# Patient Record
Sex: FEMALE | Race: WHITE
[De-identification: names, ages, dates, MRNs, and addresses within clinical notes are randomized per-mention and may not be internally consistent; named-entity substitution may affect disease eponyms.]

---

## 2020-02-03 ENCOUNTER — HOSPITAL ENCOUNTER (EMERGENCY)
Dept: HOSPITAL 62 - ER | Age: 25
Discharge: HOME | End: 2020-02-03
Payer: COMMERCIAL

## 2020-02-03 VITALS — SYSTOLIC BLOOD PRESSURE: 125 MMHG | DIASTOLIC BLOOD PRESSURE: 68 MMHG

## 2020-02-03 DIAGNOSIS — K62.5: Primary | ICD-10-CM

## 2020-02-03 DIAGNOSIS — N83.202: ICD-10-CM

## 2020-02-03 DIAGNOSIS — N93.8: ICD-10-CM

## 2020-02-03 DIAGNOSIS — Z97.5: ICD-10-CM

## 2020-02-03 DIAGNOSIS — B37.9: ICD-10-CM

## 2020-02-03 LAB
ADD MANUAL DIFF: NO
ALBUMIN SERPL-MCNC: 4.9 G/DL (ref 3.5–5)
ALP SERPL-CCNC: 56 U/L (ref 38–126)
ANION GAP SERPL CALC-SCNC: 10 MMOL/L (ref 5–19)
APPEARANCE UR: CLEAR
APTT PPP: YELLOW S
AST SERPL-CCNC: 31 U/L (ref 14–36)
BACTERIA (WET MOUNT): (no result)
BASOPHILS # BLD AUTO: 0 10^3/UL (ref 0–0.2)
BASOPHILS NFR BLD AUTO: 0.3 % (ref 0–2)
BILIRUB DIRECT SERPL-MCNC: 0.2 MG/DL (ref 0–0.4)
BILIRUB SERPL-MCNC: 0.4 MG/DL (ref 0.2–1.3)
BILIRUB UR QL STRIP: NEGATIVE
BUN SERPL-MCNC: 14 MG/DL (ref 7–20)
CALCIUM: 9.8 MG/DL (ref 8.4–10.2)
CHLAM PCR: NOT DETECTED
CHLORIDE SERPL-SCNC: 104 MMOL/L (ref 98–107)
CO2 SERPL-SCNC: 26 MMOL/L (ref 22–30)
EOSINOPHIL # BLD AUTO: 0.1 10^3/UL (ref 0–0.6)
EOSINOPHIL NFR BLD AUTO: 1.1 % (ref 0–6)
EPITHELIALS (WET MOUNT): (no result)
ERYTHROCYTE [DISTWIDTH] IN BLOOD BY AUTOMATED COUNT: 13.5 % (ref 11.5–14)
GLUCOSE SERPL-MCNC: 91 MG/DL (ref 75–110)
GLUCOSE UR STRIP-MCNC: NEGATIVE MG/DL
HCT VFR BLD CALC: 40.9 % (ref 36–47)
HGB BLD-MCNC: 14.2 G/DL (ref 12–15.5)
INR PPP: 1
KETONES UR STRIP-MCNC: NEGATIVE MG/DL
LYMPHOCYTES # BLD AUTO: 2.1 10^3/UL (ref 0.5–4.7)
LYMPHOCYTES NFR BLD AUTO: 26.1 % (ref 13–45)
MCH RBC QN AUTO: 30.7 PG (ref 27–33.4)
MCHC RBC AUTO-ENTMCNC: 34.7 G/DL (ref 32–36)
MCV RBC AUTO: 89 FL (ref 80–97)
MONOCYTES # BLD AUTO: 0.7 10^3/UL (ref 0.1–1.4)
MONOCYTES NFR BLD AUTO: 8.3 % (ref 3–13)
NEUTROPHILS # BLD AUTO: 5.2 10^3/UL (ref 1.7–8.2)
NEUTS SEG NFR BLD AUTO: 64.2 % (ref 42–78)
NITRITE UR QL STRIP: NEGATIVE
PH UR STRIP: 8 [PH] (ref 5–9)
PLATELET # BLD: 220 10^3/UL (ref 150–450)
POTASSIUM SERPL-SCNC: 4.4 MMOL/L (ref 3.6–5)
PROT SERPL-MCNC: 8.2 G/DL (ref 6.3–8.2)
PROT UR STRIP-MCNC: NEGATIVE MG/DL
PROTHROMBIN TIME: 13.2 SEC (ref 11.4–15.4)
RBC # BLD AUTO: 4.62 10^6/UL (ref 3.72–5.28)
RBCS (WET MOUNT): (no result)
SP GR UR STRIP: 1.02
T.VAGINALIS (WET MOUNT): (no result)
TOTAL CELLS COUNTED % (AUTO): 100 %
UROBILINOGEN UR-MCNC: NEGATIVE MG/DL (ref ?–2)
WBC # BLD AUTO: 8.1 10^3/UL (ref 4–10.5)
WBCS (WET MOUNT): (no result)
YEAST (WET MOUNT): (no result)

## 2020-02-03 PROCEDURE — 87591 N.GONORRHOEAE DNA AMP PROB: CPT

## 2020-02-03 PROCEDURE — 93976 VASCULAR STUDY: CPT

## 2020-02-03 PROCEDURE — 36415 COLL VENOUS BLD VENIPUNCTURE: CPT

## 2020-02-03 PROCEDURE — 99284 EMERGENCY DEPT VISIT MOD MDM: CPT

## 2020-02-03 PROCEDURE — 87210 SMEAR WET MOUNT SALINE/INK: CPT

## 2020-02-03 PROCEDURE — 85025 COMPLETE CBC W/AUTO DIFF WBC: CPT

## 2020-02-03 PROCEDURE — 80053 COMPREHEN METABOLIC PANEL: CPT

## 2020-02-03 PROCEDURE — 81025 URINE PREGNANCY TEST: CPT

## 2020-02-03 PROCEDURE — 81001 URINALYSIS AUTO W/SCOPE: CPT

## 2020-02-03 PROCEDURE — 76830 TRANSVAGINAL US NON-OB: CPT

## 2020-02-03 PROCEDURE — 87491 CHLMYD TRACH DNA AMP PROBE: CPT

## 2020-02-03 PROCEDURE — 85610 PROTHROMBIN TIME: CPT

## 2020-02-03 NOTE — RADIOLOGY REPORT (SQ)
EXAM DESCRIPTION:  U/S NON OB PEL TV W/DOPPLER



COMPLETED DATE/TIME:  2/3/2020 2:17 pm



REASON FOR STUDY:  pelvic pain, bleeding



COMPARISON:  None.



TECHNIQUE:  Dynamic and static grayscale images acquired of the pelvis via transvaginal approach and 
recorded on PACS. Additional selected color Doppler and spectral images recorded.



LIMITATIONS:  None.



FINDINGS:  UTERUS: Contour normal.  No mass.

ENDOMETRIAL STRIPE: No focal or generalized thickening. No masses.

CERVIX:  The cervix measures 2.2 cm in length.  No nabothian cysts.

RIGHT OVARY AND DOPPLER: Normal size. No worrisome masses. Normal arterial vascular flow without evid
ence for torsion.

LEFT OVARY AND DOPPLER:  A 3.1 x 2.7 x 2.4 cm cyst.  A small follicle lies adjacent to the cyst.  Nor
mal arterial vascular flow without evidence for torsion.

FREE FLUID: None noted.

OTHER: No other significant finding.

MEASUREMENTS:

UTERUS: 7.3 x 4.1 x 3.3 cm.

ENDOMETRIAL STRIPE: 5.0 mm

RIGHT OVARY: 3.0 x 2.7 x 1.7 cm

LEFT OVARY: 4.2 x 4.1 x 2.9 cm



IMPRESSION:  1.  Left ovarian cyst.



TECHNICAL DOCUMENTATION:  JOB ID:  [id number]

 2011 [employer]- All Rights Reserved                          Rev-[id number]



Reading location - IP/workstation name: [email or web address]

## 2020-02-03 NOTE — ER DOCUMENT REPORT
ED Medical Screen (RME)





- General


Chief Complaint: Urinary Problem


Stated Complaint: URINARY PROBLEM


Time Seen by Provider: 02/03/20 12:58


Primary Care Provider: 


FAUSTO SANDY MD [Primary Care Provider] - Follow up as needed


Notes: 





HPI: 24-year-old female presenting to the emergency department stating "I am 

having blood with urination and bowel movements and also vaginally".  Patient 

states that she has moved her bowels several times in the last 24 hours, states 

that she has seen some blood with the bowel movements.  Has not had a bowel 

movement of just blood.  States also when she wipes with urinating or when she 

urinates she sees some blood in the toilet.  No clots.  Patient has Nexplanon 

and had not had a menstrual cycle in the last 2 years.  She stated she started 

having vaginal bleeding yesterday as well.  States that she has not had to use 

tampons or menstrual pads because it is not significantly heavy.  Complains of 

mild cramping in the lower pelvis.  No fever nausea vomiting





I have greeted and performed a rapid initial assessment of this patient.  A 

comprehensive ED assessment and evaluation of the patient, analysis of test 

results and completion of the medical decision making process will be conducted 

by additional ED providers








PHYSICAL EXAMINATION:





GENERAL: Well-appearing, well-nourished and in no acute distress.


HEAD: Atraumatic, normocephalic.


EYES:  sclera anicteric, conjunctiva are normal.


ENT: Moist mucous membranes.


NECK: Normal range of motion


LUNGS: Normal work of breathing


HEART: 2+ radial pulses bilaterally


ABD: limited by positioning for exam in triage.  No reproducible abdominal pain 

on palpation.  Pelvic and rectal exam deferred in triage


EXTREMITIES: no pitting or edema.  No cyanosis.


NEUROLOGICAL: No focal neurological deficits. Moves all extremities spontaneou

sly and on command.


PSYCH: Normal mood, normal affect.


SKIN: Warm, Dry, normal turgor, no rashes or lesions noted.











- Related Data


Allergies/Adverse Reactions: 


                                        





No Known Allergies Allergy (Unverified 02/03/20 12:58)


   











Physical Exam





- Vital signs


Vitals: 





                                        











Temp Pulse Resp BP Pulse Ox


 


 98.0 F   79   16   125/78   100 


 


 02/03/20 12:57  02/03/20 12:57  02/03/20 12:57  02/03/20 12:57  02/03/20 12:57














Course





- Vital Signs


Vital signs: 





                                        











Temp Pulse Resp BP Pulse Ox


 


 98.0 F   79   16   125/78   100 


 


 02/03/20 12:57  02/03/20 12:57  02/03/20 12:57  02/03/20 12:57  02/03/20 12:57














Doctor's Discharge





- Discharge


Referrals: 


FAUSTO SANDY MD [Primary Care Provider] - Follow up as needed

## 2020-02-03 NOTE — ER DOCUMENT REPORT
ED General





<AMALIA LINARES - Last Filed: 02/03/20 15:28>





- General


TRAVEL OUTSIDE OF THE [place] IN LAST 30 DAYS: No





<ABDIEL SHERIDAN - Last Filed: 02/03/20 16:30>





- General


Chief Complaint: Bloody Stools


Stated Complaint: URINARY PROBLEM


Time Seen by Provider: 02/03/20 12:58


Primary Care Provider: 


FAUSTO SANDY MD [COMMUNITY BASED STAFF] - Follow up as needed


Notes: 





Patient is a 24-year-old female with no significant past medical history who 

presents to the emergency department with a chief complaint of blood per rectum 

and blood per vagina that began yesterday.  She states she is having normal 

regular bowel movements however she noted some bright red and dark red blood 

mixed with the stool yesterday.  She denies any pain with bowel movements.  She 

admits to some vague lower abdominal/pelvic discomfort today.  She also notes 

that every time she has a bowel movement she gets bright red blood from the 

vagina but only with bowel movements.  She denies wearing any pads or tampons as

she states "it is not like that", reiterating that it is only with bowel 

movements that she bleeds from the vagina.  She does have a Nexplanon in place 

and has unprotected sex.  She states she is noticed a little bit of difference 

in odor of her normal vaginal discharge but otherwise normal.  Denies any pain 

with bowel movement.  (ABDIEL SHERIDAN)





- Related Data


Allergies/Adverse Reactions: 


                                        





No Known Allergies Allergy (Unverified 02/03/20 12:58)


   











Past Medical History





- Social History


Smoking Status: Unknown if Ever Smoked


Family History: None


Patient has suicidal ideation: No


Patient has homicidal ideation: No





<ABDIEL SHERIDAN - Last Filed: 02/03/20 16:30>





Review of Systems





- Review of Systems


Gastrointestinal: Abdominal pain, Blood streaked bowels


Genitourinary: Pain, Other - Vaginal bleeding


Female Genitourinary: Vaginal bleeding


-: Yes All other systems reviewed and negative





<ABDIEL SHERIDAN - Last Filed: 02/03/20 16:30>





Physical Exam





- General


General appearance: Appears well


In distress: None





- Respiratory


Respiratory status: No respiratory distress


Chest status: Nontender


Breath sounds: Normal


Chest palpation: Normal





- Cardiovascular


Rhythm: Regular


Heart sounds: Normal auscultation





- Abdominal


Inspection: Normal


Distension: No distension


Bowel sounds: Normal


Tenderness: Nontender


Organomegaly: No organomegaly





- Back


Back: Normal, Nontender.  No: CVA tenderness





- Neurological


Neuro grossly intact: Yes


Cognition: Normal


Orientation: AAOx4


Franca Coma Scale Eye Opening: Spontaneous


Wh[place] Coma Scale Verbal: Oriented


Franca Coma Scale Motor: Obeys Commands


Franca Coma Scale Total: 15


Speech: Normal


Motor strength normal: LUE, RUE, LLE, RLE


Sensory: Normal





- Psychological


Associated symptoms: Normal affect, Normal mood





- Skin


Skin Temperature: Warm


Skin Moisture: Dry


Skin Color: Normal





<ABDIEL SHERIDAN - Last Filed: 02/03/20 16:30>





- Vital signs


Vitals: 


                                        











Temp Pulse Resp BP Pulse Ox


 


 98.0 F   79   16   125/78   100 


 


 02/03/20 12:57  02/03/20 12:57  02/03/20 12:57  02/03/20 12:57  02/03/20 12:57














Course





- Laboratory


Result Diagrams: 


                                 02/03/20 13:18





                                 02/03/20 13:18





<AMALIA LINARES - Last Filed: 02/03/20 15:28>





- Laboratory


Result Diagrams: 


                                 02/03/20 13:18





                                 02/03/20 13:18





<ABDIEL SHERIDAN - Last Filed: 02/03/20 16:30>





- Re-evaluation


Re-evalutation: 





02/03/20 16:26


No significant genital/rectal pathology reported on exam from nurse practitioner

colleague, per patient's request for female.  She will be referred to GI for 

further investigation and management.  She will follow-up with her OB/GYN re

garding her dysfunctional uterine bleeding and small left ovarian cyst.  She was

treated here today for a yeast infection and sent home with a prescription for 

bacterial vaginosis, pending GC cultures.  Discussed with her the importance of 

outpatient follow-up and advised that she return here or any ER immediately with

any new, persistent or worsening symptoms.  She verbalized understood and agree

d. (ABDIEL SHERIDAN)





- Vital Signs


Vital signs: 


                                        











Temp Pulse Resp BP Pulse Ox


 


 98.0 F   79   16   125/78   100 


 


 02/03/20 12:57  02/03/20 12:57  02/03/20 12:57  02/03/20 12:57  02/03/20 12:57














- Laboratory


Laboratory results interpreted by me: 


                                        











  02/03/20





  13:18


 


Urine Blood  LARGE H


 


Ur Leukocyte Esterase  TRACE H


 


Urine Ascorbic Acid  20 H














Procedures





- Pelvic Exam


  ** Pelvic exam


Cultures obtained: Yes


Wet prep obtained: Yes





<VIJAYAMALIA - Last Filed: 02/03/20 15:28>





- Pelvic Exam


  ** Pelvic exam


Notes: 





02/03/20 15:28


Patient requested a female to perform her pelvic examination.  This was 

performed by myself as well as the PCT Anderia assisting.  Pelvic exam was 

explained and distended by the patient prior to examination.








Patient's external genitalia was unremarkable.  Patient did tolerate the 

insertion of the speculum fairly well with minimal pain.  Was able to visualize 

the cervix.  Patient did have a mild to moderate amount of dark red blood around

the cervix.  This was removed with large swabs.  I did obtain a wet mount as 

well as a gonorrhea and Chlamydia cultures.  No significant amount of discharge.


 (AMALIA LINARES)





Discharge





<AMALIA LINARES - Last Filed: 02/03/20 15:28>





<ABDIEL SHERIDAN - Last Filed: 02/03/20 16:30>





- Discharge


Clinical Impression: 


 Vaginal bleeding, Rectal bleeding





Condition: Stable


Disposition: HOME, SELF-CARE


Instructions:  Vaginal Bleeding ([facility]), Rectal Bleeding, Unclear Cause ([facility])


Additional Instructions: 


Follow-up with your regular doctor in 2 to 3 days for reevaluation.  Return here

or any ER immediately with any new, persistent or worsening symptoms.  Please 

also follow-up with your OB/GYN and the listed gastroenterologist for further 

care and management.


Prescriptions: 


Metronidazole [Flagyl] 500 mg PO Q12 7 Days #14 tablet


Referrals: 


FAUSTO SANDY MD [COMMUNITY BASED STAFF] - Follow up as needed


BRONSON ROGERS MD [NO LOCAL MD] - Follow up as needed